# Patient Record
Sex: MALE | Race: AMERICAN INDIAN OR ALASKA NATIVE
[De-identification: names, ages, dates, MRNs, and addresses within clinical notes are randomized per-mention and may not be internally consistent; named-entity substitution may affect disease eponyms.]

---

## 2019-06-09 ENCOUNTER — HOSPITAL ENCOUNTER (EMERGENCY)
Dept: HOSPITAL 5 - ED | Age: 29
Discharge: HOME | End: 2019-06-09
Payer: OTHER GOVERNMENT

## 2019-06-09 VITALS — SYSTOLIC BLOOD PRESSURE: 132 MMHG | DIASTOLIC BLOOD PRESSURE: 69 MMHG
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
## 2019-06-09 DIAGNOSIS — S39.012A: Primary | ICD-10-CM
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
## 2019-06-09 PROCEDURE — 72100 X-RAY EXAM L-S SPINE 2/3 VWS: CPT

## 2019-06-09 NOTE — EMERGENCY DEPARTMENT REPORT
ED Back Pain/Injury HPI





- General


Chief Complaint: Back Pain/Injury


Stated Complaint: BACK PAIN/INJURY


Source: patient


Mode of arrival: Ambulatory


Limitations: No Limitations





- History of Present Illness


Initial Comments: 





This is an 28-year-old -American male who presents to the emergency room 

with low back pain for 2 weeks.  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 

and currently doing a lot of PT drills that he think has aggravated his lower 

back.  He denies change in urination or bowel movement, urinary frequency, 

urgency, dysuria, swelling, paresthesias, weakness, or radiating pain


MD Complaint: back pain


Onset/Timin


-: week(s)


Similar Symptoms Previously: No


Place: work


Radiation: none


Severity: moderate


Severity scale (0 -10): 5


Quality: aching


Consistency: intermittent


Improves With: none


Worsens With: movement


Associated Symptoms: denies: numbness, difficulty urinating, incontinence, 

fever/chills


Treatments Prior to Arrival: NSAIDS





- Related Data


                                  Previous Rx's











 Medication  Instructions  Recorded  Last Taken  Type


 


Naproxen [Naprosyn] 500 mg PO TID PRN #20 tablet 19 Unknown Rx











                                    Allergies











Allergy/AdvReac Type Severity Reaction Status Date / Time


 


No Known Allergies Allergy   Unverified 19 11:10














ED Review of Systems


ROS: 


Stated complaint: BACK PAIN/INJURY


Other details as noted in HPI





Constitutional: denies: chills, fever


Respiratory: denies: cough, shortness of breath, wheezing


Cardiovascular: denies: chest pain, palpitations


Gastrointestinal: denies: abdominal pain, nausea, diarrhea


Musculoskeletal: back pain.  denies: joint swelling, arthralgia


Skin: denies: rash, lesions


Neurological: denies: headache, weakness, paresthesias


Psychiatric: denies: anxiety, depression





ED Back Pain Physical Exam





- Exam


General: 


Vital signs noted. No distress. Alert and acting appropriately.





Back/Abdomen: Yes Sacroiliac Tenderness, No Abdominal Tenderness, No 

Perithoracic Tenderness, No Perilumbar Tenderness, No Flank Tenderness, No 

Straight Leg Raise Pain


Neuro: Yes Normal Sensation, Yes Normal DTR's, Yes Normal Gait, No Motor 

Weakness





ED Course


                                   Vital Signs











  19





  11:10


 


Temperature 98.2 F


 


Pulse Rate 57 L


 


Respiratory 18





Rate 


 


Blood Pressure 132/69


 


O2 Sat by Pulse 99





Oximetry 














Ed Back Pain Tests





- Tests


Tests: Normal X Rays





ED Medical Decision Making





- Radiology Data


Radiology results: report reviewed





EXAM: XR SPINE LUMBOSACRAL 2-3V 





HISTORY: low back pain 





TECHNIQUE: 3 views 





COMPARISON: None available. 





FINDINGS: 





No vertebral fracture, bony erosion, or sclerosis is identified. There is no 

gross malalignment, 


spondylolisthesis, or retrolisthesis seen. 





The intravertebral disc spaces are preserved. No evidence for significant 

degenerative disease is 


seen. 





The pedicles are intact throughout. There is no paraspinal soft tissue mass 

seen. 





IMPRESSION: 





1. Unremarkable lumbar spine x-ray series. 





- Medical Decision Making





Patient was examined by me.  Vitals are normal and patient is in no acute 

distress.  Obtained a x-ray of L-spine.  X-rays dictated by radiologist and 

report reviewed by myself.  Unremarkable lumbar spine x-ray series. Denies 

ringing or frequency, urgency, dysuria, testicular enlargement or pain.  There 

is no concerns of urinary tract infection or urethritis.


Findings susceptible of muscle strain.  Patient informed of results.  Start 

naproxen for pain. Plan discussed with patient to discharge home and treat 

outpatient. He agrees with ER plan. Patient discharged home in stable condition.

 Follow up with PCP in 2-3 days.


Critical care attestation.: 


If time is entered above; I have spent that time in minutes in the direct care 

of this critically ill patient, excluding procedure time.








ED Disposition


Clinical Impression: 


 Strain of muscle, fascia and tendon of lower back, initial encounter





Low back pain


Qualifiers:


 Chronicity: acute Back pain laterality: bilateral Sciatica presence: without 

sciatica Qualified Code(s): M54.5 - Low back pain





Disposition:  TO HOME OR SELFCARE


Is pt being admited?: No


Does the pt Need Aspirin: No


Condition: Stable


Instructions:  Muscle Strain (ED), Core Strengthening Exercises (GEN)


Additional Instructions: 


Rest


Use ice or heat on affected area for 20 minutes and off for 2 hours.


Take pain medication three times a day as needed for pain.


Follow up with Primary Care Provider in 2-3 days.


Prescriptions: 


Naproxen [Naprosyn] 500 mg PO TID PRN #20 tablet


 PRN Reason: Pain , Severe (7-10)


Referrals: 


[facility] [Provider Group] - 3-5 Days


[facility],  [Provider Group] - 3-5 Days


[facility] [Provider Group] - 3-5 Days


DARIUS OSEGUERA MD [Staff Physician] - 3-5 Days


Forms:  Work/School Release Form(ED)


Time of Disposition: 13:42

## 2019-06-09 NOTE — XRAY REPORT
EXAM: XR SPINE LUMBOSACRAL 2-3V 

 

HISTORY: low back pain 

 

TECHNIQUE: 3 views 

 

COMPARISON: None available.  

 

FINDINGS:  

 

No vertebral fracture, bony erosion, or sclerosis is identified. There is no gross malalignment, spon
dylolisthesis, or retrolisthesis seen.  

 

The intravertebral disc spaces are preserved.  No evidence for significant degenerative disease is se
en. 

 

The pedicles are intact throughout. There is no paraspinal soft tissue mass seen.    

 

IMPRESSION: 

 

1.   Unremarkable lumbar spine x-ray series.  

 

This document is electronically signed by Domenic Zhang MD., June 9 2019 01:21:16 PM ET